# Patient Record
Sex: FEMALE | URBAN - METROPOLITAN AREA
[De-identification: names, ages, dates, MRNs, and addresses within clinical notes are randomized per-mention and may not be internally consistent; named-entity substitution may affect disease eponyms.]

---

## 2019-07-27 ENCOUNTER — OFFICE VISIT (OUTPATIENT)
Dept: URBAN - METROPOLITAN AREA CLINIC 22 | Facility: CLINIC | Age: 24
End: 2019-07-27
Payer: COMMERCIAL

## 2019-07-27 DIAGNOSIS — H52.13 MYOPIA, BILATERAL: Primary | ICD-10-CM

## 2019-07-27 DIAGNOSIS — H16.8 OTHER KERATITIS: ICD-10-CM

## 2019-07-27 PROCEDURE — 92014 COMPRE OPH EXAM EST PT 1/>: CPT | Performed by: OPTOMETRIST

## 2019-07-27 PROCEDURE — 92310 CONTACT LENS FITTING OU: CPT | Performed by: OPTOMETRIST

## 2019-07-27 RX ORDER — NEOMYCIN SULFATE, POLYMYXIN B SULFATE AND DEXAMETHASONE 3.5; 10000; 1 MG/ML; [USP'U]/ML; MG/ML
SUSPENSION OPHTHALMIC
Qty: 1 | Refills: 0 | Status: ACTIVE
Start: 2019-07-27

## 2019-07-27 ASSESSMENT — KERATOMETRY
OS: 45.33
OD: 47.15

## 2019-07-27 ASSESSMENT — INTRAOCULAR PRESSURE
OD: 17
OS: 15

## 2019-07-27 ASSESSMENT — VISUAL ACUITY
OD: 20/20
OS: 20/20

## 2019-07-27 NOTE — IMPRESSION/PLAN
Impression: Other keratitis: H16.8. Plan: Infiltrative keratitis OD. Rx Maxitrol QID OD X 7 days. Gave 1 day disposable contacts to wear. RTC 1 week.

## 2019-08-03 ENCOUNTER — OFFICE VISIT (OUTPATIENT)
Dept: URBAN - METROPOLITAN AREA CLINIC 22 | Facility: CLINIC | Age: 24
End: 2019-08-03

## 2019-08-03 PROCEDURE — 92310 CONTACT LENS FITTING OU: CPT | Performed by: OPTOMETRIST

## 2019-08-03 NOTE — IMPRESSION/PLAN
Impression: Myopia, bilateral: H52.13. Plan: Rx new contacts and discontinue Maxitrol as keratitis has resolved.